# Patient Record
Sex: FEMALE | Race: WHITE | NOT HISPANIC OR LATINO | Employment: FULL TIME | URBAN - METROPOLITAN AREA
[De-identification: names, ages, dates, MRNs, and addresses within clinical notes are randomized per-mention and may not be internally consistent; named-entity substitution may affect disease eponyms.]

---

## 2018-03-28 ENCOUNTER — OFFICE VISIT (OUTPATIENT)
Dept: SURGERY | Facility: CLINIC | Age: 58
End: 2018-03-28
Payer: COMMERCIAL

## 2018-03-28 ENCOUNTER — OFFICE VISIT (OUTPATIENT)
Dept: GYNECOLOGY | Facility: CLINIC | Age: 58
End: 2018-03-28
Attending: OBSTETRICS & GYNECOLOGY
Payer: COMMERCIAL

## 2018-03-28 VITALS — HEIGHT: 67 IN | BODY MASS INDEX: 22.76 KG/M2 | WEIGHT: 145 LBS

## 2018-03-28 VITALS — SYSTOLIC BLOOD PRESSURE: 120 MMHG | WEIGHT: 150.8 LBS | DIASTOLIC BLOOD PRESSURE: 80 MMHG | BODY MASS INDEX: 23.62 KG/M2

## 2018-03-28 DIAGNOSIS — D25.9 UTERINE LEIOMYOMA, UNSPECIFIED LOCATION: ICD-10-CM

## 2018-03-28 DIAGNOSIS — Z12.39 SCREENING BREAST EXAMINATION: Primary | ICD-10-CM

## 2018-03-28 DIAGNOSIS — Z12.39 ENCOUNTER FOR BREAST CANCER SCREENING OTHER THAN MAMMOGRAM: ICD-10-CM

## 2018-03-28 DIAGNOSIS — Z01.419 ENCOUNTER FOR GYNECOLOGICAL EXAMINATION WITHOUT ABNORMAL FINDING: Primary | ICD-10-CM

## 2018-03-28 PROBLEM — E80.4 GILBERTS DISEASE: Status: ACTIVE | Noted: 2018-03-28

## 2018-03-28 PROBLEM — C44.91 MALIGNANT BASAL CELL NEOPLASM OF SKIN: Status: ACTIVE | Noted: 2018-03-28

## 2018-03-28 PROBLEM — Z82.49 FAMILY HISTORY OF EARLY CAD: Status: ACTIVE | Noted: 2018-03-28

## 2018-03-28 PROCEDURE — S0612 ANNUAL GYNECOLOGICAL EXAMINA: HCPCS | Performed by: OBSTETRICS & GYNECOLOGY

## 2018-03-28 PROCEDURE — 99211 OFF/OP EST MAY X REQ PHY/QHP: CPT | Performed by: SURGERY

## 2018-03-28 NOTE — PROGRESS NOTES
Yuli comes back to the office simply for breast examination.  She was seen in December with an abnormal mammogram.  At that time she could not have a clinical breast examination due to rather extensive scabies.  Since then she has not noticed any new breast masses.    There are no changes in her past medical history, past surgical history, medications or allergies.    Physical exam: Well-developed, well-nourished female in no apparent distress.  Her breast examination is symmetric with no dominant masses, skin changes, nipple discharge or axillary adenopathy.    Impression: Normal clinical breast examination with annual screening mammogram from December.  At her request she would like to continue to follow up once a year.  We will give her a prescription to have a bilateral mammogram done at [place] in December and she will return to see me for clinical examination after that.

## 2018-03-28 NOTE — PROGRESS NOTES
Chief Complaint:  Annual  Last visit 2017, pap neg/neg then  Recent mammo neg, sees Eliza for abn mammo in the past.  Up to date with colonoscopy.   No active complaints, no flushes,no vag dryness, using Ky.  No sexual issues.    HPI:  3/28/2018      Yuli Kuo is a 56 y.o. female who presents for annual exam. The patient has no complaints today. The patient is sexually active. GYN screening history: last pap: was normal. The patient has never been taking hormone replacement therapy. Patient denies post-menopausal vaginal bleeding.. The patient participates in regular exercise: yes.  The patient reports that there is not domestic violence in her life.    History of abnormal Pap smear: no  Family history of uterine or ovarian cancer: no  History of abnormal mammogram: yes - see above  Family history of breast cancer: no    OB History: Menstrual History:  OB History      Para Term  AB Living    0 0 0 0 0 0    SAB TAB Ectopic Multiple Live Births    0 0 0 0 0         Menarche age:   No LMP recorded. Patient is postmenopausal.         Medical History:   Past Medical History:   Diagnosis Date   • Avulsion fracture of distal fibula 10/2013   • Basal cell carcinoma    • Disease of thyroid gland     low   • Eosinophilic esophagitis    • Fibroids    • Gilbert's syndrome    • Hemorrhoids     rectal   • Multinodular goiter        Surgical History:   Past Surgical History:   Procedure Laterality Date   • COLONOSCOPY     • MOHS SURGERY     • SHOULDER ARTHROSCOPY Right    • SHOULDER SURGERY Bilateral        Social History:   Social History     Social History   • Marital status:      Spouse name: N/A   • Number of children: N/A   • Years of education: N/A     Social History Main Topics   • Smoking status: Never Smoker   • Smokeless tobacco: Never Used   • Alcohol use No   • Drug use: No   • Sexual activity: Not Asked     Other Topics Concern   • None     Social History  Narrative   • None       Family History:   Family History   Problem Relation Age of Onset   • Lung cancer Father    • Depression Father    • Anxiety disorder Father    • Brain cancer Father    • Prostate cancer Father    • Colon cancer Father    • Heart disease Mother    • Stroke Mother    • Atrial fibrillation Mother    • Hyperthyroidism Mother    • Glaucoma Paternal Grandmother        Current Medications:   Current Outpatient Prescriptions   Medication Sig Dispense Refill   • Lactobac no.41-Bifidobact no.7 (PROBIOTIC-10) 70 mg (3 billion cell) capsule No SIG Entered       No current facility-administered medications for this visit.        Allergies: Influenza virus vaccine tv 13-14 (18+) cell deriv. and Penicillins    Review of Systems  Constitutional: purposeful wgt loss  Gastrointestinal: negative  Genitourinary:negative  Reproductive:no breast pain or new or enlarging lumps on self exam, no vaginal bleeding, no hot flashes  Integument/breast: negative  Musculoskeletal:negative  Neurological: negative  Behavioral/Psych: negative  Endocrine: negative    Physical Exam  /80 (BP Location: Right upper arm, Patient Position: Sitting)   Wt 68.4 kg (150 lb 12.8 oz)   BMI 23.62 kg/m²      General Appearance: Alert, cooperative, no acute distress  Head: Normocephalic, without obvious abnormality, atraumatic    Neck: no adenopathy  Thyroid: no enlargement/tenderness/nodules  Lungs: Clear to auscultation bilaterally, respirations unlabored  Heart: Regular rate and rhythm, S1 and S2 normal, no murmur, rub or gallop  Breast: done by DR Kay today  Abdomen: Soft, nontender, nondistended,   no masses, no organomegaly  Back: kyphosisNo   Pelvic:     Vulva normal  Vagina normal mucosa  Cervixno lesions  Uterusanteverted  Adnexa normal adnexa  Rectal: mass No   Extremities: wnl  Musculoskeletal:wnl  Skin: Skin color, texture, turgor normal, no rashes or lesions  Lymph nodes: inguinal and axillary nodes  normal  Neurologic:oriented and  memory intact  Psych:normal affect and behavior appropriate    Assessment & Plan    Nl menopausal exam. Pap due 2022.   Sm myoma 8 mm 2016. Slightly smaller than prior. No need for repeat    Return in about 1 year (around 3/28/2019).  Sabra Reyes MD

## 2018-10-01 ENCOUNTER — TELEPHONE (OUTPATIENT)
Dept: GYNECOLOGY | Facility: CLINIC | Age: 58
End: 2018-10-01

## 2018-10-01 NOTE — TELEPHONE ENCOUNTER
Rt lower abd pain, intermittent for 6 weeks, not related to food, talked to her gi. Feels it may be related to new yoga. Can't relate to any provoking agent. No change in sxs. Needs exam.  Pt now feels it started before yoga.

## 2018-10-01 NOTE — TELEPHONE ENCOUNTER
Patient called to report intermittent right lower abdominal pain.  She has experiencing the pain for approximately six weeks.  She called to request an ultrasound script. Her phone number is [phone].

## 2018-10-30 ENCOUNTER — TRANSCRIBE ORDERS (OUTPATIENT)
Dept: SCHEDULING | Age: 58
End: 2018-10-30

## 2018-10-30 DIAGNOSIS — Z86.018 PERSONAL HISTORY OF OTHER BENIGN NEOPLASM: Primary | ICD-10-CM

## 2018-11-08 PROCEDURE — 82274 ASSAY TEST FOR BLOOD FECAL: CPT | Performed by: INTERNAL MEDICINE

## 2018-11-13 ENCOUNTER — LAB REQUISITION (OUTPATIENT)
Dept: LAB | Facility: HOSPITAL | Age: 58
End: 2018-11-13
Attending: INTERNAL MEDICINE
Payer: COMMERCIAL

## 2018-11-13 DIAGNOSIS — R19.7 DIARRHEA: ICD-10-CM

## 2018-11-14 LAB — HEMOCCULT STL QL IA: NEGATIVE

## 2018-11-16 ENCOUNTER — HOSPITAL ENCOUNTER (OUTPATIENT)
Dept: RADIOLOGY | Age: 58
Discharge: HOME | End: 2018-11-16
Attending: OBSTETRICS & GYNECOLOGY
Payer: COMMERCIAL

## 2018-11-16 DIAGNOSIS — Z86.018 PERSONAL HISTORY OF OTHER BENIGN NEOPLASM: ICD-10-CM

## 2018-11-16 PROCEDURE — 76856 US EXAM PELVIC COMPLETE: CPT

## 2019-01-16 ENCOUNTER — OFFICE VISIT (OUTPATIENT)
Dept: SURGERY | Facility: CLINIC | Age: 59
End: 2019-01-16
Payer: COMMERCIAL

## 2019-01-16 ENCOUNTER — HOSPITAL ENCOUNTER (OUTPATIENT)
Dept: RADIOLOGY | Age: 59
Discharge: HOME | End: 2019-01-16
Attending: SURGERY
Payer: COMMERCIAL

## 2019-01-16 VITALS
HEIGHT: 67 IN | SYSTOLIC BLOOD PRESSURE: 113 MMHG | TEMPERATURE: 97.7 F | BODY MASS INDEX: 23.39 KG/M2 | HEART RATE: 79 BPM | DIASTOLIC BLOOD PRESSURE: 71 MMHG | WEIGHT: 149 LBS

## 2019-01-16 DIAGNOSIS — Z12.39 ENCOUNTER FOR BREAST CANCER SCREENING OTHER THAN MAMMOGRAM: ICD-10-CM

## 2019-01-16 DIAGNOSIS — Z12.39 SCREENING BREAST EXAMINATION: ICD-10-CM

## 2019-01-16 DIAGNOSIS — Z12.39 SCREENING BREAST EXAMINATION: Primary | ICD-10-CM

## 2019-01-16 PROBLEM — D25.9 UTERINE FIBROID: Status: ACTIVE | Noted: 2019-01-16

## 2019-01-16 PROCEDURE — 99213 OFFICE O/P EST LOW 20 MIN: CPT | Performed by: SURGERY

## 2019-01-16 PROCEDURE — 77063 BREAST TOMOSYNTHESIS BI: CPT

## 2019-01-16 RX ORDER — GUAIFENESIN 1200 MG
TABLET, EXTENDED RELEASE 12 HR ORAL
COMMUNITY
End: 2022-04-20

## 2019-01-16 NOTE — LETTER
January 16, 2019     Yue Gonzalez MD  [address]  [facility] [address]    Patient: Yuli Kuo   YOB: 1961   Date of Visit: 1/16/2019       Dear Dr. Gonzalez:    Thank you for referring Yuli Kuo to me for evaluation. Below are my notes for this consultation.    If you have questions, please do not hesitate to call me. I look forward to following your patient along with you.         Sincerely,        Natalie Kay MD        CC: No Recipients  Natalie Kay MD  1/16/2019  6:08 PM  Signed  Karyna comes the office today for breast screening.  She previously had a lot of anxiety and concerns about her risk for developing breast cancer and at one point it actually requested prophylactic mastectomies.  She states that she does feel much better now in terms of her overall care.  She is postmenopausal and not on any estrogen and with significant weight loss, while she now feels that the breasts are lumpy they are not quite as concerning to her as they were in previous years.  She had a bilateral mammogram done through the breast imaging center earlier today that was reviewed in the office.  There were no suspicious changes and one-year follow-up was recommended.    There are no changes in her past medical history, past surgical history, medications or allergies.    There are no changes in her family history.  There is no family history of breast cancer.    ROS:   Significant for no abnormalities in all systems reviewed.    Physical exam: Well-developed, well-nourished female in no apparent distress.  She is alert and oriented ×3 and her mood and affect are appropriate.  Her HEENT has no cervical or supraclavicular adenopathy.  There are no thyroid masses.  Her breast examination is symmetric.  There are no dominant masses, skin changes, nipple discharge or axillary adenopathy.        Assessment/Plan :  Problem List Items Addressed This Visit     Screening breast  examination - Primary     She now feels much better about her breast self-examination as she is thinner and postmenopausal.  She has less anxiety about requiring bilateral mastectomies but would like to continue to come here once a year which is not unreasonable.  We will see her back in the office in 1 year with a routine screening bilateral mammogram.         Relevant Orders    BI DIAGNOSTIC MAMMOGRAM BILATERAL             Return in about 1 year (around 1/16/2020).    MD Natalie Mike MD

## 2019-01-16 NOTE — ASSESSMENT & PLAN NOTE
She now feels much better about her breast self-examination as she is thinner and postmenopausal.  She has less anxiety about requiring bilateral mastectomies but would like to continue to come here once a year which is not unreasonable.  We will see her back in the office in 1 year with a routine screening bilateral mammogram.

## 2019-01-16 NOTE — PROGRESS NOTES
Karyna comes the office today for breast screening.  She previously had a lot of anxiety and concerns about her risk for developing breast cancer and at one point it actually requested prophylactic mastectomies.  She states that she does feel much better now in terms of her overall care.  She is postmenopausal and not on any estrogen and with significant weight loss, while she now feels that the breasts are lumpy they are not quite as concerning to her as they were in previous years.  She had a bilateral mammogram done through the breast imaging center earlier today that was reviewed in the office.  There were no suspicious changes and one-year follow-up was recommended.    There are no changes in her past medical history, past surgical history, medications or allergies.    There are no changes in her family history.  There is no family history of breast cancer.    ROS:   Significant for no abnormalities in all systems reviewed.    Physical exam: Well-developed, well-nourished female in no apparent distress.  She is alert and oriented ×3 and her mood and affect are appropriate.  Her HEENT has no cervical or supraclavicular adenopathy.  There are no thyroid masses.  Her breast examination is symmetric.  There are no dominant masses, skin changes, nipple discharge or axillary adenopathy.        Assessment/Plan :  Problem List Items Addressed This Visit     Screening breast examination - Primary     She now feels much better about her breast self-examination as she is thinner and postmenopausal.  She has less anxiety about requiring bilateral mastectomies but would like to continue to come here once a year which is not unreasonable.  We will see her back in the office in 1 year with a routine screening bilateral mammogram.         Relevant Orders    BI DIAGNOSTIC MAMMOGRAM BILATERAL             Return in about 1 year (around 1/16/2020).    MD Natalie Mike MD

## 2020-01-22 ENCOUNTER — HOSPITAL ENCOUNTER (OUTPATIENT)
Dept: RADIOLOGY | Age: 60
Discharge: HOME | End: 2020-01-22
Attending: SURGERY
Payer: COMMERCIAL

## 2020-01-22 ENCOUNTER — OFFICE VISIT (OUTPATIENT)
Dept: SURGERY | Facility: CLINIC | Age: 60
End: 2020-01-22
Payer: COMMERCIAL

## 2020-01-22 VITALS
SYSTOLIC BLOOD PRESSURE: 113 MMHG | HEIGHT: 67 IN | BODY MASS INDEX: 23.39 KG/M2 | DIASTOLIC BLOOD PRESSURE: 75 MMHG | WEIGHT: 149 LBS | HEART RATE: 77 BPM

## 2020-01-22 DIAGNOSIS — Z12.39 SCREENING BREAST EXAMINATION: ICD-10-CM

## 2020-01-22 DIAGNOSIS — Z12.39 SCREENING BREAST EXAMINATION: Primary | ICD-10-CM

## 2020-01-22 PROCEDURE — G0279 TOMOSYNTHESIS, MAMMO: HCPCS

## 2020-01-22 PROCEDURE — 99213 OFFICE O/P EST LOW 20 MIN: CPT | Performed by: SURGERY

## 2020-01-22 NOTE — ASSESSMENT & PLAN NOTE
She has moderately dense breast tissue but no suspicious risk factors for breast cancer.  Today her clinical examination is normal but given her anxiety, we will see her back in the office in 1 year with a new baseline bilateral mammogram.

## 2020-01-22 NOTE — PROGRESS NOTES
Yuli comes to the office today for follow-up given a history of abnormal mammograms and anxiety regarding the possibility of developing breast cancer.  She really has no strong risk factors.  She is doing beautifully and has no complaints.  She had a bilateral mammogram done through the breast imaging center earlier today that was reviewed in the office.  There were no suspicious changes and one-year follow-up was recommended.  She has category C dense tissue.    There are no changes in her past medical history, past surgical history, medications or allergies.    There are no changes in her family history.    ROS:   Significant for no abnormalities in all systems reviewed.    Physical exam: Well-developed, well-nourished female in no apparent distress.  She is alert and oriented ×3 and her mood and affect are appropriate.  Her HEENT has no cervical or supraclavicular adenopathy.  There are no thyroid masses.  Her breast examination is symmetric.  There are no dominant masses, skin changes, nipple discharge or axillary adenopathy.        Assessment/Plan :  Problem List Items Addressed This Visit        Other    Screening breast examination - Primary     She has moderately dense breast tissue but no suspicious risk factors for breast cancer.  Today her clinical examination is normal but given her anxiety, we will see her back in the office in 1 year with a new baseline bilateral mammogram.         Relevant Orders    BI DIAGNOSTIC MAMMOGRAM BILATERAL             Return in about 1 year (around 1/22/2021).    MD Natalie Mike MD

## 2021-01-20 DIAGNOSIS — Z23 ENCOUNTER FOR IMMUNIZATION: ICD-10-CM

## 2021-01-30 ENCOUNTER — IMMUNIZATIONS (OUTPATIENT)
Dept: FAMILY MEDICINE CLINIC | Facility: HOSPITAL | Age: 61
End: 2021-01-30

## 2021-01-30 DIAGNOSIS — Z23 ENCOUNTER FOR IMMUNIZATION: Primary | ICD-10-CM

## 2021-01-30 PROCEDURE — 91301 SARS-COV-2 / COVID-19 MRNA VACCINE (MODERNA) 100 MCG: CPT

## 2021-01-30 PROCEDURE — 0011A SARS-COV-2 / COVID-19 MRNA VACCINE (MODERNA) 100 MCG: CPT

## 2021-03-17 ENCOUNTER — OFFICE VISIT (OUTPATIENT)
Dept: SURGERY | Facility: CLINIC | Age: 61
End: 2021-03-17
Payer: COMMERCIAL

## 2021-03-17 ENCOUNTER — HOSPITAL ENCOUNTER (OUTPATIENT)
Dept: RADIOLOGY | Facility: HOSPITAL | Age: 61
Discharge: HOME | End: 2021-03-17
Payer: COMMERCIAL

## 2021-03-17 VITALS
DIASTOLIC BLOOD PRESSURE: 91 MMHG | WEIGHT: 157 LBS | SYSTOLIC BLOOD PRESSURE: 141 MMHG | HEIGHT: 67 IN | BODY MASS INDEX: 24.64 KG/M2 | HEART RATE: 67 BPM

## 2021-03-17 DIAGNOSIS — Z12.39 SCREENING BREAST EXAMINATION: Primary | ICD-10-CM

## 2021-03-17 DIAGNOSIS — Z12.39 SCREENING BREAST EXAMINATION: ICD-10-CM

## 2021-03-17 PROCEDURE — 99212 OFFICE O/P EST SF 10 MIN: CPT | Performed by: SURGERY

## 2021-03-17 PROCEDURE — G0279 TOMOSYNTHESIS, MAMMO: HCPCS

## 2021-03-17 NOTE — ASSESSMENT & PLAN NOTE
She has no particularly strong risk factors for development of breast cancer.  At her request, we will see her back in the mammogram and office visit in 1 year.

## 2021-03-17 NOTE — PROGRESS NOTES
Yuli comes to the office today for follow-up given a history of abnormal mammograms.  Currently, she has no breast complaints.  In fact, she is quite happy.  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  Her and her  are building a house in [place].  She is postmenopausal and not on any estrogen.  She had a bilateral mammogram done through the breast imaging center earlier today that was reviewed in the office.  There were no suspicious changes and one-year follow-up was recommended.    There are no changes in her past medical history, past surgical history, medications or allergies.    There are no changes in her family history.    ROS:   Significant for abnormalities in all systems reviewed    Physical exam: Well-developed, well-nourished female in no apparent distress.  She is alert and oriented ×3 and her mood and affect are appropriate.  Her HEENT has no cervical or supraclavicular adenopathy.  There are no thyroid masses.  Her breast examination is symmetric.  There are no dominant masses, skin changes, nipple discharge or axillary adenopathy.        Assessment/Plan :  Problem List Items Addressed This Visit        Other    Screening breast examination - Primary     She has no particularly strong risk factors for development of breast cancer.  At her request, we will see her back in the mammogram and office visit in 1 year.         Relevant Orders    BI DIAGNOSTIC MAMMOGRAM BILATERAL (TOMOSYNTHESIS)             Return in about 1 year (around 3/17/2022).    MD Natalie Mike MD

## 2021-07-16 ENCOUNTER — TRANSCRIBE ORDERS (OUTPATIENT)
Dept: ADMINISTRATIVE | Age: 61
End: 2021-07-16

## 2021-07-16 ENCOUNTER — APPOINTMENT (OUTPATIENT)
Dept: LAB | Facility: HOSPITAL | Age: 61
End: 2021-07-16
Attending: INTERNAL MEDICINE
Payer: COMMERCIAL

## 2021-07-16 DIAGNOSIS — E80.4 GILBERT SYNDROME: Primary | ICD-10-CM

## 2021-07-16 DIAGNOSIS — E80.4 GILBERT SYNDROME: ICD-10-CM

## 2021-07-16 DIAGNOSIS — R17 UNSPECIFIED JAUNDICE: ICD-10-CM

## 2021-07-16 LAB
ALBUMIN SERPL-MCNC: 4.1 G/DL (ref 3.4–5)
ALP SERPL-CCNC: 73 IU/L (ref 35–126)
ALT SERPL-CCNC: 20 IU/L (ref 11–54)
ANION GAP SERPL CALC-SCNC: 10 MEQ/L (ref 3–15)
AST SERPL-CCNC: 18 IU/L (ref 15–41)
BASOPHILS # BLD: 0.08 K/UL (ref 0.01–0.1)
BASOPHILS NFR BLD: 1.1 %
BILIRUB SERPL-MCNC: 1.8 MG/DL (ref 0.3–1.2)
BUN SERPL-MCNC: 17 MG/DL (ref 8–20)
CALCIUM SERPL-MCNC: 9.3 MG/DL (ref 8.9–10.3)
CHLORIDE SERPL-SCNC: 103 MEQ/L (ref 98–109)
CO2 SERPL-SCNC: 26 MEQ/L (ref 22–32)
CREAT SERPL-MCNC: 1 MG/DL (ref 0.6–1.1)
DIFFERENTIAL METHOD BLD: ABNORMAL
EOSINOPHIL # BLD: 0.93 K/UL (ref 0.04–0.36)
EOSINOPHIL NFR BLD: 13.1 %
ERYTHROCYTE [DISTWIDTH] IN BLOOD BY AUTOMATED COUNT: 12.6 % (ref 11.7–14.4)
GFR SERPL CREATININE-BSD FRML MDRD: 56.4 ML/MIN/1.73M*2
GGT SERPL-CCNC: 24 IU/L (ref 7–50)
GLUCOSE SERPL-MCNC: 89 MG/DL (ref 70–99)
HCT VFR BLDCO AUTO: 43.8 % (ref 35–45)
HGB BLD-MCNC: 14.9 G/DL (ref 11.8–15.7)
IMM GRANULOCYTES # BLD AUTO: 0.02 K/UL (ref 0–0.08)
IMM GRANULOCYTES NFR BLD AUTO: 0.3 %
LYMPHOCYTES # BLD: 1.63 K/UL (ref 1.2–3.5)
LYMPHOCYTES NFR BLD: 22.9 %
MCH RBC QN AUTO: 31.4 PG (ref 28–33.2)
MCHC RBC AUTO-ENTMCNC: 34 G/DL (ref 32.2–35.5)
MCV RBC AUTO: 92.2 FL (ref 83–98)
MONOCYTES # BLD: 0.62 K/UL (ref 0.28–0.8)
MONOCYTES NFR BLD: 8.7 %
NEUTROPHILS # BLD: 3.83 K/UL (ref 1.7–7)
NEUTS SEG NFR BLD: 53.9 %
NRBC BLD-RTO: 0 %
PDW BLD AUTO: 10.5 FL (ref 9.4–12.3)
PLATELET # BLD AUTO: 234 K/UL (ref 150–369)
POTASSIUM SERPL-SCNC: 4.1 MEQ/L (ref 3.6–5.1)
PROT SERPL-MCNC: 6 G/DL (ref 6–8.2)
RBC # BLD AUTO: 4.75 M/UL (ref 3.93–5.22)
SODIUM SERPL-SCNC: 139 MEQ/L (ref 136–144)
WBC # BLD AUTO: 7.11 K/UL (ref 3.8–10.5)

## 2021-07-16 PROCEDURE — 82977 ASSAY OF GGT: CPT

## 2021-07-16 PROCEDURE — 36415 COLL VENOUS BLD VENIPUNCTURE: CPT

## 2021-07-16 PROCEDURE — 85025 COMPLETE CBC W/AUTO DIFF WBC: CPT

## 2021-07-16 PROCEDURE — 80053 COMPREHEN METABOLIC PANEL: CPT

## 2022-03-30 ENCOUNTER — TRANSCRIBE ORDERS (OUTPATIENT)
Dept: SCHEDULING | Age: 62
End: 2022-03-30

## 2022-03-30 DIAGNOSIS — R10.31 RIGHT LOWER QUADRANT PAIN: Primary | ICD-10-CM

## 2022-04-13 ENCOUNTER — TRANSCRIBE ORDERS (OUTPATIENT)
Dept: SCHEDULING | Age: 62
End: 2022-04-13

## 2022-04-13 DIAGNOSIS — L57.8 OTHER SKIN CHANGES DUE TO CHRONIC EXPOSURE TO NONIONIZING RADIATION: Primary | ICD-10-CM

## 2022-04-20 ENCOUNTER — HOSPITAL ENCOUNTER (OUTPATIENT)
Dept: RADIOLOGY | Age: 62
Discharge: HOME | End: 2022-04-20
Attending: OBSTETRICS & GYNECOLOGY
Payer: COMMERCIAL

## 2022-04-20 ENCOUNTER — OFFICE VISIT (OUTPATIENT)
Dept: SURGERY | Facility: CLINIC | Age: 62
End: 2022-04-20
Payer: COMMERCIAL

## 2022-04-20 ENCOUNTER — HOSPITAL ENCOUNTER (OUTPATIENT)
Dept: RADIOLOGY | Facility: HOSPITAL | Age: 62
Discharge: HOME | End: 2022-04-20
Attending: SURGERY
Payer: COMMERCIAL

## 2022-04-20 VITALS
TEMPERATURE: 97 F | SYSTOLIC BLOOD PRESSURE: 116 MMHG | BODY MASS INDEX: 24.33 KG/M2 | DIASTOLIC BLOOD PRESSURE: 76 MMHG | WEIGHT: 155 LBS | RESPIRATION RATE: 16 BRPM | HEART RATE: 63 BPM | HEIGHT: 67 IN | OXYGEN SATURATION: 99 %

## 2022-04-20 DIAGNOSIS — Z12.39 SCREENING BREAST EXAMINATION: ICD-10-CM

## 2022-04-20 DIAGNOSIS — Z12.39 SCREENING BREAST EXAMINATION: Primary | ICD-10-CM

## 2022-04-20 DIAGNOSIS — R10.31 RIGHT LOWER QUADRANT PAIN: ICD-10-CM

## 2022-04-20 PROCEDURE — 76830 TRANSVAGINAL US NON-OB: CPT

## 2022-04-20 PROCEDURE — 99212 OFFICE O/P EST SF 10 MIN: CPT | Performed by: SURGERY

## 2022-04-20 PROCEDURE — 77063 BREAST TOMOSYNTHESIS BI: CPT

## 2022-04-20 PROCEDURE — 3008F BODY MASS INDEX DOCD: CPT | Performed by: SURGERY

## 2022-04-20 RX ORDER — CHOLECALCIFEROL (VITAMIN D3) 25 MCG
2000 TABLET ORAL DAILY
COMMUNITY
End: 2023-05-09 | Stop reason: HOSPADM

## 2022-04-20 NOTE — PROGRESS NOTES
Yuli returns to the office today for follow-up given anxiety regarding the possibility of developing breast cancer.  Overall, she is doing well.  She is postmenopausal and not on any estrogen.  She is very happy now that she is decided to retire and is still moving forward with building their house in [place].  She had a bilateral mammogram done through the breast imaging center earlier today that was reviewed in the office.  There were no suspicious changes and one-year follow-up was recommended.  She has category C dense breast tissue.    There are no changes in her past medical history, past surgical history, medications or allergies.    There are no changes in her family history.    ROS:   Significant for no abnormalities in all systems reviewed.    Physical exam: Well-developed, well-nourished female in no apparent distress.  She is alert and oriented ×3 and her mood and affect are appropriate.  Her HEENT has no cervical or supraclavicular adenopathy.  There are no thyroid masses.  Her breast examination is symmetric.  There are no dominant masses, skin changes, nipple discharge or axillary adenopathy.        Assessment/Plan :  Problem List Items Addressed This Visit        Other    Screening breast examination - Primary    Relevant Orders    BI SCREENING MAMMOGRAM BILATERAL(TOMOSYNTHESIS) (Completed)    BI SCREENING MAMMOGRAM BILATERAL(TOMOSYNTHESIS)             Return in about 1 year (around 4/20/2023).    Natalie Kay MD

## 2022-04-20 NOTE — ASSESSMENT & PLAN NOTE
There are no abnormalities on her clinical breast examination or screening mammogram.  She will return to the office in 1 year at her request with bilateral imaging and screening mammogram.

## 2022-11-11 ENCOUNTER — TRANSCRIBE ORDERS (OUTPATIENT)
Dept: SCHEDULING | Age: 62
End: 2022-11-11

## 2022-11-11 DIAGNOSIS — M54.2 CERVICALGIA: Primary | ICD-10-CM

## 2022-11-22 ENCOUNTER — HOSPITAL ENCOUNTER (OUTPATIENT)
Dept: RADIOLOGY | Age: 62
Discharge: HOME | End: 2022-11-22
Attending: FAMILY MEDICINE
Payer: OTHER MISCELLANEOUS

## 2022-11-22 DIAGNOSIS — M54.2 CERVICALGIA: ICD-10-CM

## 2022-12-13 ENCOUNTER — HOSPITAL ENCOUNTER (OUTPATIENT)
Dept: RADIOLOGY | Age: 62
Discharge: HOME | End: 2022-12-13
Attending: INTERNAL MEDICINE
Payer: COMMERCIAL

## 2022-12-13 DIAGNOSIS — L57.8 OTHER SKIN CHANGES DUE TO CHRONIC EXPOSURE TO NONIONIZING RADIATION: ICD-10-CM

## 2022-12-13 PROCEDURE — 76536 US EXAM OF HEAD AND NECK: CPT

## 2023-04-24 ENCOUNTER — TELEPHONE (OUTPATIENT)
Dept: SURGERY | Facility: CLINIC | Age: 63
End: 2023-04-24
Payer: COMMERCIAL

## 2023-04-24 DIAGNOSIS — Z12.31 ENCOUNTER FOR SCREENING MAMMOGRAM FOR HIGH-RISK PATIENT: Primary | ICD-10-CM

## 2023-04-24 NOTE — TELEPHONE ENCOUNTER
Test Order Request   Patient PCP: Pt States, No Pcp  Next Office Visit: 5/9/2023  Preferred Lab: [facility]   Tests Requested: screening mammogram  , MyChart, or US Mail?: MyChart and Mail    The practice will reach out to discuss your request within 2 business days.

## 2023-04-26 ENCOUNTER — TELEPHONE (OUTPATIENT)
Dept: SURGERY | Facility: CLINIC | Age: 63
End: 2023-04-26
Payer: COMMERCIAL

## 2023-04-26 NOTE — TELEPHONE ENCOUNTER
Patient is requesting the mammo order be sent through [facility] as a message attachment , she may use a facility outside of [facility].

## 2023-05-09 ENCOUNTER — TELEPHONE (OUTPATIENT)
Dept: SURGERY | Facility: CLINIC | Age: 63
End: 2023-05-09

## 2023-05-09 ENCOUNTER — OFFICE VISIT (OUTPATIENT)
Dept: SURGERY | Facility: CLINIC | Age: 63
End: 2023-05-09
Payer: COMMERCIAL

## 2023-05-09 VITALS
SYSTOLIC BLOOD PRESSURE: 118 MMHG | WEIGHT: 156 LBS | OXYGEN SATURATION: 96 % | DIASTOLIC BLOOD PRESSURE: 76 MMHG | HEART RATE: 70 BPM | BODY MASS INDEX: 24.48 KG/M2 | HEIGHT: 67 IN | RESPIRATION RATE: 18 BRPM | TEMPERATURE: 97.1 F

## 2023-05-09 DIAGNOSIS — Z12.39 SCREENING BREAST EXAMINATION: Primary | ICD-10-CM

## 2023-05-09 DIAGNOSIS — R92.30 DENSE BREAST TISSUE ON MAMMOGRAM: ICD-10-CM

## 2023-05-09 PROCEDURE — 99213 OFFICE O/P EST LOW 20 MIN: CPT | Performed by: SURGERY

## 2023-05-09 PROCEDURE — 3008F BODY MASS INDEX DOCD: CPT | Performed by: SURGERY

## 2023-05-09 ASSESSMENT — PAIN SCALES - GENERAL: PAINLEVEL: 0-NO PAIN

## 2023-05-09 NOTE — ASSESSMENT & PLAN NOTE
She had bilateral screening mammogram in April, 2022 through [facility] with no evidence of malignancy.  She had a bilateral mammogram with spot compression of the right breast in May, 2023 through Dr. Jalloh's office.  No significant change in either breast was noted.  Dr. Jalloh gave a BI-RADS 0 evaluation of the mammogram until [facility] images can be reviewed.  The disc of [facility] images are currently at Dr. Jalloh's office.  She has heterogeneously dense breast tissue and is not interested in supplemental breast imaging.  Clinical exam is negative for any palpable density, overlying skin changes or breast abnormality.  At a minimum, we will plan to see her back in the office in 1 year with a new baseline bilateral mammogram, which will be completed through Dr. Jalloh's office.  I assured [facility] that if any changes were conveyed after review of breast imaging we would update her promptly.

## 2023-05-09 NOTE — ASSESSMENT & PLAN NOTE
She has scattered to heterogeneously dense breast tissue.  She has no family history of breast cancer but does have a fear of developing breast cancer or missing an early breast cancer.  We discussed the pros and cons of breast MRI with an abbreviated breast MRI as well as potential for false positives.  She will consider an abbreviated breast MRI but at this point we will continue with bilateral diagnostic mammography.  She will contact our office if she wishes to pursue a breast MRI in the future.  We will plan to see her back in the office in 1 year with a new baseline mammogram.

## 2023-05-09 NOTE — PROGRESS NOTES
Yuli returns to the office today for follow-up for clinical exam and review of breast imaging, given her underlying anxiety surrounding breast cancer.  She offers no breast complaints.  She was happy to report that she is enjoying [facility] in her house in [place] is completed.  She had a bilateral screening mammogram done through Dr. Jalloh's office on May 2, 2023 that was reviewed in the office. She had previously had breast imaging through [facility]. No evidence of malignancy in the left breast on initial mammographic imaging.  There is an asymmetry in the right breast which required spot compression.  The asymmetry did not persist in the right breast.  Unfortunately, her imaging from [facility] was not available at the time of mammographic interpretation.  She was given a BI-RADS 0 until outside imaging became available for full evaluation.  She dropped those images off to Dr. Jalloh's office today and we will await an addendum.  At a minimum she is recommended for annual screening mammography.  She denies any family history of breast cancer previous breast biopsy.  She has heterogeneously dense breast tissue.    There are no changes in her past medical history, past surgical history, medications or allergies.    There are no changes in her family history.    ROS:   Significant for no abnormalities in all systems reviewed.    Physical exam: Well-developed, well-nourished female in no apparent distress.  She is alert and oriented ×3 and her mood and affect are appropriate.  Her HEENT has no cervical or supraclavicular adenopathy.  There are no thyroid masses.  Her breast examination is symmetric.  There are no dominant masses, skin changes, nipple discharge or axillary adenopathy.    Assessment/Plan :  Problem List Items Addressed This Visit        Other    Screening breast examination - Primary     She had bilateral screening mammogram in April, 2022 through [facility] with no evidence of  malignancy.  She had a bilateral mammogram with spot compression of the right breast in May, 2023 through Dr. Jalloh's office.  No significant change in either breast was noted.  Dr. Jalloh gave a BI-RADS 0 evaluation of the mammogram until [facility] health images can be reviewed.  The disc of [facility] health images are currently at Dr. Jalloh's office.  She has heterogeneously dense breast tissue and is not interested in supplemental breast imaging.  Clinical exam is negative for any palpable density, overlying skin changes or breast abnormality.  At a minimum, we will plan to see her back in the office in 1 year with a new baseline bilateral mammogram, which will be completed through Dr. Jalloh's office.  I assured [facility] that if any changes were conveyed after review of breast imaging we would update her promptly.         Relevant Orders    BI SCREENING MAMMOGRAM BILATERAL(TOMOSYNTHESIS)    Dense breast tissue on mammogram     She has scattered to heterogeneously dense breast tissue.  She has no family history of breast cancer but does have a fear of developing breast cancer or missing an early breast cancer.  We discussed the pros and cons of breast MRI with an abbreviated breast MRI as well as potential for false positives.  She will consider an abbreviated breast MRI but at this point we will continue with bilateral diagnostic mammography.  She will contact our office if she wishes to pursue a breast MRI in the future.  We will plan to see her back in the office in 1 year with a new baseline mammogram.          At a minimum, we will plan to see her back in the office in 1 year with a new baseline mammogram which will be completed through Dr. Dhara Jalloh's office.  At this point her imaging this year was considered a BI-RADS 0 until Dr. Jalloh has the opportunity to review previous [facility] imaging.  We will contact [facility] if any changes and recommendations are made and adjust her schedule accordingly.  Tali  JACQUELINE White

## 2023-10-16 ENCOUNTER — TRANSCRIBE ORDERS (OUTPATIENT)
Dept: SCHEDULING | Age: 63
End: 2023-10-16

## 2023-10-16 DIAGNOSIS — R63.4 ABNORMAL WEIGHT LOSS: Primary | ICD-10-CM

## 2023-11-08 ENCOUNTER — HOSPITAL ENCOUNTER (OUTPATIENT)
Dept: RADIOLOGY | Age: 63
Discharge: HOME | End: 2023-11-08
Attending: INTERNAL MEDICINE
Payer: COMMERCIAL

## 2023-11-08 DIAGNOSIS — R63.4 ABNORMAL WEIGHT LOSS: ICD-10-CM

## 2023-11-08 PROCEDURE — 76705 ECHO EXAM OF ABDOMEN: CPT

## 2023-11-16 ENCOUNTER — TRANSCRIBE ORDERS (OUTPATIENT)
Dept: SCHEDULING | Age: 63
End: 2023-11-16

## 2023-11-16 DIAGNOSIS — R68.81 EARLY SATIETY: Primary | ICD-10-CM

## 2023-11-16 DIAGNOSIS — R63.4 ABNORMAL WEIGHT LOSS: ICD-10-CM

## 2023-11-16 DIAGNOSIS — R63.8 OTHER SYMPTOMS AND SIGNS CONCERNING FOOD AND FLUID INTAKE: ICD-10-CM

## 2023-11-20 ENCOUNTER — TRANSCRIBE ORDERS (OUTPATIENT)
Dept: ADMINISTRATIVE | Age: 63
End: 2023-11-20

## 2023-11-20 ENCOUNTER — APPOINTMENT (OUTPATIENT)
Dept: LAB | Facility: HOSPITAL | Age: 63
End: 2023-11-20
Attending: INTERNAL MEDICINE
Payer: COMMERCIAL

## 2023-11-20 DIAGNOSIS — K80.20 CALCULUS OF GALLBLADDER WITHOUT CHOLECYSTITIS WITHOUT OBSTRUCTION: ICD-10-CM

## 2023-11-20 DIAGNOSIS — K20.0 EOSINOPHILIC ESOPHAGITIS: ICD-10-CM

## 2023-11-20 DIAGNOSIS — R74.01 ELEVATION OF LEVELS OF LIVER TRANSAMINASE LEVELS: Primary | ICD-10-CM

## 2023-11-20 DIAGNOSIS — R76.0 RAISED ANTIBODY TITER: ICD-10-CM

## 2023-11-20 DIAGNOSIS — R63.4 ABNORMAL WEIGHT LOSS: ICD-10-CM

## 2023-11-20 DIAGNOSIS — R74.01 ELEVATION OF LEVELS OF LIVER TRANSAMINASE LEVELS: ICD-10-CM

## 2023-11-20 LAB
ALBUMIN SERPL-MCNC: 4.4 G/DL (ref 3.5–5.7)
ALP SERPL-CCNC: 71 IU/L (ref 34–125)
ALT SERPL-CCNC: 15 IU/L (ref 7–52)
ANION GAP SERPL CALC-SCNC: 8 MEQ/L (ref 3–15)
AST SERPL-CCNC: 19 IU/L (ref 13–39)
BILIRUB DIRECT SERPL-MCNC: 0.3 MG/DL
BILIRUB SERPL-MCNC: 1.8 MG/DL (ref 0.3–1.2)
BUN SERPL-MCNC: 15 MG/DL (ref 7–25)
CALCIUM SERPL-MCNC: 9.4 MG/DL (ref 8.6–10.3)
CHLORIDE SERPL-SCNC: 106 MEQ/L (ref 98–107)
CO2 SERPL-SCNC: 27 MEQ/L (ref 21–31)
CREAT SERPL-MCNC: 0.8 MG/DL (ref 0.6–1.2)
GFR SERPL CREATININE-BSD FRML MDRD: >60 ML/MIN/1.73M*2
GLUCOSE SERPL-MCNC: 84 MG/DL (ref 70–99)
POTASSIUM SERPL-SCNC: 4.1 MEQ/L (ref 3.5–5.1)
PROT SERPL-MCNC: 6.1 G/DL (ref 6–8.2)
SODIUM SERPL-SCNC: 141 MEQ/L (ref 136–145)

## 2023-11-20 PROCEDURE — 36415 COLL VENOUS BLD VENIPUNCTURE: CPT

## 2023-11-20 PROCEDURE — 82247 BILIRUBIN TOTAL: CPT

## 2023-11-20 PROCEDURE — 80053 COMPREHEN METABOLIC PANEL: CPT

## 2023-11-27 ENCOUNTER — OFFICE VISIT (OUTPATIENT)
Dept: INTERNAL MEDICINE | Facility: CLINIC | Age: 63
End: 2023-11-27
Payer: COMMERCIAL

## 2023-11-27 VITALS
HEIGHT: 67 IN | BODY MASS INDEX: 24.17 KG/M2 | DIASTOLIC BLOOD PRESSURE: 90 MMHG | WEIGHT: 154 LBS | SYSTOLIC BLOOD PRESSURE: 122 MMHG | HEART RATE: 73 BPM | OXYGEN SATURATION: 98 % | RESPIRATION RATE: 18 BRPM

## 2023-11-27 DIAGNOSIS — Z85.828 HISTORY OF BASAL CELL CARCINOMA: ICD-10-CM

## 2023-11-27 DIAGNOSIS — Z00.00 PREVENTATIVE HEALTH CARE: Primary | ICD-10-CM

## 2023-11-27 PROCEDURE — 99386 PREV VISIT NEW AGE 40-64: CPT | Performed by: INTERNAL MEDICINE

## 2023-11-27 PROCEDURE — 3008F BODY MASS INDEX DOCD: CPT | Performed by: INTERNAL MEDICINE

## 2023-11-27 ASSESSMENT — PATIENT HEALTH QUESTIONNAIRE - PHQ9: SUM OF ALL RESPONSES TO PHQ9 QUESTIONS 1 & 2: 0

## 2023-11-27 NOTE — PROGRESS NOTES
"SUBJECTIVE:   61 y.o. female for new patient visit    Here to establish care, she would like to have an annual physical but she is most concerned about a recent severe illness over the summer with subsequent weakness, neck pain and unintentional weight loss, she completed a comprehensive GI workup at the time, all of which seems to have been negative other than some mildly elevated LFTs which have since normalized.  For the unintentional weight loss GI recommended an abdominal CT which she will complete to finalize her workup but her weight has stabilized and she is feeling well.      Hx BCC: she has had BCC removed in the past as well as \"pre-melanoma\" for which she is establishing with a new dermatologist in the near future     Weight: The patient reports no significant weight change.  Diet: healthy balanced diet, vegetarian  Exercise: exercises regularly    Past Medical History:   Diagnosis Date    Avulsion fracture of distal fibula 10/2013    Basal cell carcinoma     Disease of thyroid gland 2010    low    Eosinophilic esophagitis     Fibroids     Gilbert's syndrome     Hemorrhoids     rectal    Multinodular goiter 2013     Patient Active Problem List   Diagnosis    Malignant basal cell neoplasm of skin    Family history of early CAD    [place] disease    Hemorrhoids    Uterine fibroid    Screening breast examination    Dense breast tissue on mammogram     Past Surgical History:   Procedure Laterality Date    COLONOSCOPY  2015    MOHS SURGERY  2013    SHOULDER ARTHROSCOPY Right 1998    SHOULDER SURGERY Bilateral 1995/2001     Family History   Problem Relation Age of Onset    Lung cancer Biological Father     Depression Biological Father     Anxiety disorder Biological Father     Brain cancer Biological Father     Prostate cancer Biological Father     Colon cancer Biological Father     Heart disease Biological Mother     Stroke Biological Mother     Atrial fibrillation Biological " "Mother     Hyperthyroidism Biological Mother     Glaucoma Paternal Grandmother      No current outpatient medications on file.   Social History     Tobacco Use    Smoking status: Never    Smokeless tobacco: Never   Substance Use Topics    Alcohol use: No    Drug use: No     Allergies   Allergen Reactions    Marijuana/Cannabinoid Anaphylaxis    Influenza Virus Vaccine Tv 13-14 (18+) Cell Deriv.      Other reaction(s): vertigo    Penicillins Hives       ROS negative unless otherwise specified     OBJECTIVE:  Visit Vitals  /90 (BP Location: Left upper arm)   Pulse 73   Resp 18   Ht 1.702 m (5' 7\")   Wt 69.9 kg (154 lb)   SpO2 98%   BMI 24.12 kg/m²      Gait:  Normal  Alert and well appearing   Awake and oriented with normal affect and appropriate behavior   HEENT: Normocephalic and atraumatic, pupils equal, round, OP clear with moist mucous membranes  Neck:  supple, no thyroid enlargement, no LAD  Lungs: Normal breath sounds, lungs CTA with no RRW  Heart:Regular rate rhythm with no murmurs, gallops or rubs   Abd: Soft, non-tender, normal bowel sounds; no bruits, organomegaly or masses.  Ext: No clubbing, cyanosis or edema   Skin:  No lesions on exposed skin      Assessment/Plan     1. Preventative health care  Unclear etiology of her illness over the summer though she suspects altitude sickness, regardless she is currently symptom free and her labs have normalized so we will pursue no further workup, she may not complete her CT abdomen given her lack of sx, labs will be scanned in, obtained at the beginning of November.  - Hemoglobin A1c; Future  - Lipid panel; Future    2. History of basal cell carcinoma  Stable  - f/u with Dermatology as scheduled    Preventive Services:   Adult DTaP: deferred per patient preference  Flu Vaccine advised: refused  Zostervax/Shingrix: utd    Health Maintenance:  Colonoscopy: utd  Mammogram: utd  Pap smear: utd    Anticipatory Guidance   Safe Sex/STD's yes  Smoking " Cessation yes  Sunscreen/ABCDs yes  Diet/Exercise yes  Recommend routing opth & dental care  yes  Screened for fall risk yes  Screened for depression yes  Satisfied with access to care yes     F/u 1 year for annual physical

## 2023-11-30 ENCOUNTER — HOSPITAL ENCOUNTER (OUTPATIENT)
Dept: RADIOLOGY | Facility: HOSPITAL | Age: 63
Discharge: HOME | End: 2023-11-30
Attending: INTERNAL MEDICINE
Payer: COMMERCIAL

## 2023-11-30 DIAGNOSIS — R68.81 EARLY SATIETY: ICD-10-CM

## 2023-11-30 DIAGNOSIS — R63.4 ABNORMAL WEIGHT LOSS: ICD-10-CM

## 2023-11-30 DIAGNOSIS — R63.8 OTHER SYMPTOMS AND SIGNS CONCERNING FOOD AND FLUID INTAKE: ICD-10-CM

## 2023-11-30 PROCEDURE — 25500000 HC DRUGS/INCIDENT RAD: Performed by: INTERNAL MEDICINE

## 2023-11-30 PROCEDURE — 63600105 HC IODINE BASED CONTRAST: Mod: JW | Performed by: INTERNAL MEDICINE

## 2023-11-30 PROCEDURE — 74177 CT ABD & PELVIS W/CONTRAST: CPT

## 2023-11-30 RX ORDER — IOPAMIDOL 755 MG/ML
80 INJECTION, SOLUTION INTRAVASCULAR
Status: COMPLETED | OUTPATIENT
Start: 2023-11-30 | End: 2023-11-30

## 2023-11-30 RX ADMIN — IOPAMIDOL 80 ML: 755 INJECTION, SOLUTION INTRAVENOUS at 19:27

## 2023-11-30 RX ADMIN — BARIUM SULFATE 900 ML: 21 SUSPENSION ORAL at 19:26

## 2023-12-01 LAB
CHOLEST SERPL-MCNC: 173 MG/DL (ref 100–199)
HBA1C MFR BLD: 5.3 % (ref 4.8–5.6)
HDLC SERPL-MCNC: 76 MG/DL
LDLC SERPL CALC-MCNC: 86 MG/DL (ref 0–99)
TRIGL SERPL-MCNC: 58 MG/DL (ref 0–149)
VLDLC SERPL CALC-MCNC: 11 MG/DL (ref 5–40)

## 2024-04-22 ENCOUNTER — TELEPHONE (OUTPATIENT)
Dept: SURGERY | Facility: CLINIC | Age: 64
End: 2024-04-22
Payer: MEDICARE

## 2024-10-21 ENCOUNTER — OFFICE VISIT (OUTPATIENT)
Dept: INTERNAL MEDICINE | Facility: CLINIC | Age: 64
End: 2024-10-21
Payer: MEDICARE

## 2024-10-21 ENCOUNTER — TELEPHONE (OUTPATIENT)
Dept: INTERNAL MEDICINE | Facility: CLINIC | Age: 64
End: 2024-10-21

## 2024-10-21 ENCOUNTER — OFFICE VISIT (OUTPATIENT)
Dept: SURGERY | Facility: CLINIC | Age: 64
End: 2024-10-21
Payer: MEDICARE

## 2024-10-21 VITALS
RESPIRATION RATE: 18 BRPM | SYSTOLIC BLOOD PRESSURE: 112 MMHG | WEIGHT: 156.6 LBS | OXYGEN SATURATION: 98 % | BODY MASS INDEX: 24.58 KG/M2 | HEIGHT: 67 IN | DIASTOLIC BLOOD PRESSURE: 88 MMHG | HEART RATE: 73 BPM

## 2024-10-21 VITALS
HEIGHT: 67 IN | TEMPERATURE: 97.9 F | DIASTOLIC BLOOD PRESSURE: 98 MMHG | BODY MASS INDEX: 23.86 KG/M2 | WEIGHT: 152 LBS | SYSTOLIC BLOOD PRESSURE: 146 MMHG | RESPIRATION RATE: 18 BRPM | HEART RATE: 79 BPM

## 2024-10-21 DIAGNOSIS — K80.20 GALLSTONES: ICD-10-CM

## 2024-10-21 DIAGNOSIS — E78.00 PURE HYPERCHOLESTEROLEMIA: Primary | ICD-10-CM

## 2024-10-21 DIAGNOSIS — Z12.39 SCREENING BREAST EXAMINATION: Primary | ICD-10-CM

## 2024-10-21 DIAGNOSIS — Z82.49 FAMILY HISTORY OF EARLY CAD: ICD-10-CM

## 2024-10-21 DIAGNOSIS — E04.1 THYROID NODULE: ICD-10-CM

## 2024-10-21 DIAGNOSIS — R10.13 EPIGASTRIC PAIN: Primary | ICD-10-CM

## 2024-10-21 PROCEDURE — 200200 PR NO CHARGE: Performed by: SURGERY

## 2024-10-21 PROCEDURE — 99214 OFFICE O/P EST MOD 30 MIN: CPT | Performed by: INTERNAL MEDICINE

## 2024-10-21 PROCEDURE — 99213 OFFICE O/P EST LOW 20 MIN: CPT | Performed by: SURGERY

## 2024-10-21 PROCEDURE — 200200 PR NO CHARGE: Performed by: INTERNAL MEDICINE

## 2024-10-21 ASSESSMENT — PAIN SCALES - GENERAL: PAINLEVEL_OUTOF10: 0-NO PAIN

## 2024-10-21 NOTE — LETTER
October 21, 2024     Larissa Buenrostro MD  [address]  [address]  [facility] [address]    Patient: Yuli Kuo Dr.  YOB: 1961  Date of Visit: 10/21/2024      Dear Dr. Buenrostro:    Thank you for referring Yuli Kuo Dr. to me for evaluation. Below are my notes for this consultation.    If you have questions, please do not hesitate to call me. I look forward to following your patient along with you.         Sincerely,        JACQUELINE Collier        CC: MD Jaden Rothman Annette M, CRNP  10/21/2024  1:18 PM  Sign when Signing Visit  Yuli returns to the office today for follow-up given her dense breast tissue and anxiety regarding her overall breast health.  She offers no breast complaints and was happy to share that her house in [place] has been completed and she and her  spend 6 months out of the year in [place] with her 2 dogs and cat.  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  She had a bilateral mammogram done through Dr. Dhara Jalloh's office on April 22, 2024 but unfortunately there is no disc for review.  According to the dictated report, there are scattered calcifications in the left breast which remain unchanged.  A right breast asymmetry was noted and a definite mass was not identified with spot compression.  An ultrasound was performed by Dr. Jalloh but again no discrete mass, cystic lesion or solid mass was identified.  This was read as a BI-RADS 2 and she will be due for repeat breast imaging in April 2025.  She has scattered breast tissue density according to the dictated report.      There are no changes in her past medical history, past surgical history, medications or allergies.    There are no changes in her family history.    ROS:   Significant for no abnormalities in all systems reviewed.    Physical exam: Well-developed,  well-nourished female in no apparent distress.  She is alert and oriented ×3 and her mood and affect are appropriate.  Her HEENT has no cervical or supraclavicular adenopathy.  There are no thyroid masses.  Her breast examination is symmetric.  There are no dominant masses, skin changes, nipple discharge or axillary adenopathy.  Assessment/Plan:  Problem List Items Addressed This Visit          Other    Screening breast examination - Primary     She had a bilateral screening mammogram on April 22, 2024 through Dr. Dhara Jalloh's office.  Unfortunately, the dictated report is all we have as the images were not available for review.  According to the dictated report of right breast asymmetry was noted but not clear with spot compression.  Ultrasound of the right breast performed by Dr. Jalloh showed no discrete mass, cystic lesion or solid mass.  This was read as a BI-RADS 2.  No evidence of disease on clinical exam of the right breast or contralateral left breast.  At this point, she will repeat her mammogram through Dr. Jalloh's office in April 2025.  Will plan to see her back in the office in 1 year as long as nothing is worrisome on her breast imaging and she has no breast complaints.         Relevant Orders    BI SCREENING MAMMOGRAM BILATERAL(TOMOSYNTHESIS)   We will plan to see Yuli back in the office in 1 year for repeat clinical exam.  In the interim, she was given a prescription for a follow-up mammogram that will be due in April 2025.  She will complete this through Dr. Jalloh's office.  She will also attempt to obtain the images from Dr. Jalloh's office as well as bring next years mammographic imaging with her to her annual appointment for full review and recommendations.     Return in about 1 year (around 10/21/2025).    JACQUELINE Collier

## 2024-10-21 NOTE — LETTER
October 21, 2024     Dhara Jalloh MD  [address]  [address]  [facility] [address]    Patient: Yuli Kuo Dr.  YOB: 1961  Date of Visit: 10/21/2024      Dear Dr. Jalloh:    Thank you for referring Yuli Kuo Dr. to me for evaluation. Below are my notes for this consultation.    If you have questions, please do not hesitate to call me. I look forward to following your patient along with you.         Sincerely,        JACQUELINE Collier        CC: No Recipients    Tali Stanley CRNP  10/21/2024  1:18 PM  Sign when Signing Visit  Yuli returns to the office today for follow-up given her dense breast tissue and anxiety regarding her overall breast health.  She offers no breast complaints and was happy to share that her house in [place] has been completed and she and her  spend 6 months out of the year in [place] with her 2 dogs and cat.  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  She had a bilateral mammogram done through Dr. Dhara Jalloh's office on April 22, 2024 but unfortunately there is no disc for review.  According to the dictated report, there are scattered calcifications in the left breast which remain unchanged.  A right breast asymmetry was noted and a definite mass was not identified with spot compression.  An ultrasound was performed by Dr. Jalloh but again no discrete mass, cystic lesion or solid mass was identified.  This was read as a BI-RADS 2 and she will be due for repeat breast imaging in April 2025.  She has scattered breast tissue density according to the dictated report.      There are no changes in her past medical history, past surgical history, medications or allergies.    There are no changes in her family history.    ROS:   Significant for no abnormalities in all systems reviewed.    Physical exam: Well-developed, well-nourished  female in no apparent distress.  She is alert and oriented ×3 and her mood and affect are appropriate.  Her HEENT has no cervical or supraclavicular adenopathy.  There are no thyroid masses.  Her breast examination is symmetric.  There are no dominant masses, skin changes, nipple discharge or axillary adenopathy.  Assessment/Plan:  Problem List Items Addressed This Visit          Other    Screening breast examination - Primary     She had a bilateral screening mammogram on April 22, 2024 through Dr. Dhara Jalloh's office.  Unfortunately, the dictated report is all we have as the images were not available for review.  According to the dictated report of right breast asymmetry was noted but not clear with spot compression.  Ultrasound of the right breast performed by Dr. Jalloh showed no discrete mass, cystic lesion or solid mass.  This was read as a BI-RADS 2.  No evidence of disease on clinical exam of the right breast or contralateral left breast.  At this point, she will repeat her mammogram through Dr. Jalloh's office in April 2025.  Will plan to see her back in the office in 1 year as long as nothing is worrisome on her breast imaging and she has no breast complaints.         Relevant Orders    BI SCREENING MAMMOGRAM BILATERAL(TOMOSYNTHESIS)   We will plan to see Yuli back in the office in 1 year for repeat clinical exam.  In the interim, she was given a prescription for a follow-up mammogram that will be due in April 2025.  She will complete this through Dr. Jalloh's office.  She will also attempt to obtain the images from Dr. Jalloh's office as well as bring next years mammographic imaging with her to her annual appointment for full review and recommendations.     Return in about 1 year (around 10/21/2025).    JACQUELINE Collier

## 2024-10-21 NOTE — ASSESSMENT & PLAN NOTE
She had a bilateral screening mammogram on April 22, 2024 through Dr. Dhara Jalloh's office.  Unfortunately, the dictated report is all we have as the images were not available for review.  According to the dictated report of right breast asymmetry was noted but not clear with spot compression.  Ultrasound of the right breast performed by Dr. Jalloh showed no discrete mass, cystic lesion or solid mass.  This was read as a BI-RADS 2.  No evidence of disease on clinical exam of the right breast or contralateral left breast.  At this point, she will repeat her mammogram through Dr. Jalloh's office in April 2025.  Will plan to see her back in the office in 1 year as long as nothing is worrisome on her breast imaging and she has no breast complaints.

## 2024-10-21 NOTE — PROGRESS NOTES
Yuli returns to the office today for follow-up given her dense breast tissue and anxiety regarding her overall breast health.  She offers no breast complaints and was happy to share that her house in [place] has been completed and she and her  spend 6 months out of the year in [place] with her 2 dogs and cat.  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  She had a bilateral mammogram done through Dr. Dhara Jalloh's office on April 22, 2024 but unfortunately there is no disc for review.  According to the dictated report, there are scattered calcifications in the left breast which remain unchanged.  A right breast asymmetry was noted and a definite mass was not identified with spot compression.  An ultrasound was performed by Dr. Jalloh but again no discrete mass, cystic lesion or solid mass was identified.  This was read as a BI-RADS 2 and she will be due for repeat breast imaging in April 2025.  She has scattered breast tissue density according to the dictated report.      There are no changes in her past medical history, past surgical history, medications or allergies.    There are no changes in her family history.    ROS:   Significant for no abnormalities in all systems reviewed.    Physical exam: Well-developed, well-nourished female in no apparent distress.  She is alert and oriented ×3 and her mood and affect are appropriate.  Her HEENT has no cervical or supraclavicular adenopathy.  There are no thyroid masses.  Her breast examination is symmetric.  There are no dominant masses, skin changes, nipple discharge or axillary adenopathy.  Assessment/Plan :  Problem List Items Addressed This Visit          Other    Screening breast examination - Primary     She had a bilateral screening mammogram on April 22, 2024 through Dr. Dhara Jalloh's office.  Unfortunately, the dictated report is all we have as the images  were not available for review.  According to the dictated report of right breast asymmetry was noted but not clear with spot compression.  Ultrasound of the right breast performed by Dr. Jalloh showed no discrete mass, cystic lesion or solid mass.  This was read as a BI-RADS 2.  No evidence of disease on clinical exam of the right breast or contralateral left breast.  At this point, she will repeat her mammogram through Dr. Jalloh's office in April 2025.  Will plan to see her back in the office in 1 year as long as nothing is worrisome on her breast imaging and she has no breast complaints.         Relevant Orders    BI SCREENING MAMMOGRAM BILATERAL(TOMOSYNTHESIS)   We will plan to see Yuli back in the office in 1 year for repeat clinical exam.  In the interim, she was given a prescription for a follow-up mammogram that will be due in April 2025.  She will complete this through Dr. Jalloh's office.  She will also attempt to obtain the images from Dr. Jalloh's office as well as bring next years mammographic imaging with her to her annual appointment for full review and recommendations.     Return in about 1 year (around 10/21/2025).    JACQUELINE Collier

## 2024-10-21 NOTE — PROGRESS NOTES
"SUBJECTIVE:   62 y.o. female for a same day visit    Abdominal pain: over the last year or so she has had 4 bouts of pain in the epigastrium always after eating, it is described as sharp and 8/10 in intensity, it is better with laying down, she has taken pepcid and omeprazole which seems to relieve it within about 20 minutes, she otherwise has no ongoing epigastric pain otherwise outside of these events, she does have known gallstones and is concerned about biliary colic but is not interested in cholecystectomy, fortunately she just had an EGD with Dr. Cain and her esophagus and stomach appeared normal, the pathology is pending but likelihood of h pylori is low.    Thyroid nodule: She is wondering about when to follow up her thyroid nodule, this was noted last year, it was TR4 but under 1cm which technically doesn't meet criteria to follow, she has no sx at this time    Past Medical History:   Diagnosis Date    Avulsion fracture of distal fibula 10/2013    Basal cell carcinoma     Disease of thyroid gland 2010    low    Eosinophilic esophagitis     Fibroids     Gilbert's syndrome     Hemorrhoids     rectal    Multinodular goiter 2013     Patient Active Problem List   Diagnosis    History of basal cell carcinoma    Family history of early CAD    [place] disease    Hemorrhoids    Uterine fibroid    Screening breast examination    Dense breast tissue on mammogram     Social History     Tobacco Use    Smoking status: Never    Smokeless tobacco: Never   Substance Use Topics    Alcohol use: No    Drug use: No     No current outpatient medications on file.   Allergies   Allergen Reactions    Marijuana/Cannabinoid Anaphylaxis    Influenza Virus Vaccine Tv 13-14 (18+) Cell Deriv.      Other reaction(s): vertigo    Penicillins Hives       ROS negative unless otherwise specified    OBJECTIVE:  Visit Vitals  /88 (BP Location: Left upper arm)   Pulse 73   Resp 18   Ht 1.702 m (5' 7\")   Wt 71 kg (156 lb 9.6 oz)   SpO2 " 98%   BMI 24.53 kg/m²      Gait:  Normal  Alert and well appearing   Awake and oriented with normal affect and appropriate behavior   Abd: Soft, non-tender, normal bowel sounds; no bruits, organomegaly or masses.  Skin:  No lesions on exposed skin    Assessment/Plan     1. Epigastric pain (Primary)  EGD reassuring, less likely GERD/gastritis based on pattern of sx however she can try acutely neutralizing acid with tums, maalox or alkaseltzer next time she has an attack to confirm or rule out excess acid, unlikely h pylori with no gastritis on EGD but bx is pending, the pain is infrequent and currently doesn't warrant any daily medication  - CBC and Differential; Future    2. Gallstones  After discussion today it is possible her pain is related to biliary colic but she prefers not to have elective cholecystectomy which is reasonable as her sx are very infrequent, she will continue a low fat diet but understands alarm signs that may indicate cholecystitis which would warrant more urgent surgical intervention  - Comprehensive metabolic panel; Future  - Bilirubin, total and direct; Future    3. Family history of early CAD  - Lipid panel; Future  - Hemoglobin A1c; Future    4. Thyroid nodule  After discussion today we agree that her nodule does not need to be followed up currently but perhaps in 2-3 years to ensure stability     F/u 1 year for annual physical

## 2024-10-29 ENCOUNTER — APPOINTMENT (OUTPATIENT)
Dept: LAB | Facility: HOSPITAL | Age: 64
End: 2024-10-29
Attending: INTERNAL MEDICINE
Payer: MEDICARE

## 2024-10-29 ENCOUNTER — TRANSCRIBE ORDERS (OUTPATIENT)
Dept: LAB | Facility: HOSPITAL | Age: 64
End: 2024-10-29

## 2024-10-29 DIAGNOSIS — R10.13 EPIGASTRIC PAIN: ICD-10-CM

## 2024-10-29 DIAGNOSIS — K80.20 CALCULUS OF GALLBLADDER WITHOUT CHOLECYSTITIS WITHOUT OBSTRUCTION: ICD-10-CM

## 2024-10-29 DIAGNOSIS — Z82.49 FAMILY HISTORY OF ISCHEMIC HEART DISEASE AND OTHER DISEASES OF THE CIRCULATORY SYSTEM: ICD-10-CM

## 2024-10-29 DIAGNOSIS — R10.13 EPIGASTRIC PAIN: Primary | ICD-10-CM

## 2024-10-29 PROBLEM — E78.00 PURE HYPERCHOLESTEROLEMIA: Status: ACTIVE | Noted: 2024-10-29

## 2024-10-29 LAB
ALBUMIN SERPL-MCNC: 4.6 G/DL (ref 3.5–5.7)
ALP SERPL-CCNC: 84 IU/L (ref 34–125)
ALT SERPL-CCNC: 19 IU/L (ref 7–52)
ANION GAP SERPL CALC-SCNC: 5 MEQ/L (ref 3–15)
AST SERPL-CCNC: 20 IU/L (ref 13–39)
BASOPHILS # BLD: 0.04 K/UL (ref 0.01–0.1)
BASOPHILS NFR BLD: 0.7 %
BILIRUB DIRECT SERPL-MCNC: 0.3 MG/DL
BILIRUB SERPL-MCNC: 2 MG/DL (ref 0.3–1.2)
BILIRUB SERPL-MCNC: 2 MG/DL (ref 0.3–1.2)
BUN SERPL-MCNC: 18 MG/DL (ref 7–25)
CALCIUM SERPL-MCNC: 9.5 MG/DL (ref 8.6–10.3)
CHLORIDE SERPL-SCNC: 106 MEQ/L (ref 98–107)
CHOLEST SERPL-MCNC: 206 MG/DL
CO2 SERPL-SCNC: 29 MEQ/L (ref 21–31)
CREAT SERPL-MCNC: 0.8 MG/DL (ref 0.6–1.2)
DIFFERENTIAL METHOD BLD: NORMAL
EGFRCR SERPLBLD CKD-EPI 2021: >60 ML/MIN/1.73M*2
EOSINOPHIL # BLD: 0.35 K/UL (ref 0.04–0.36)
EOSINOPHIL NFR BLD: 5.8 %
ERYTHROCYTE [DISTWIDTH] IN BLOOD BY AUTOMATED COUNT: 12.4 % (ref 11.7–14.4)
EST. AVERAGE GLUCOSE BLD GHB EST-MCNC: 105 MG/DL
GLUCOSE SERPL-MCNC: 86 MG/DL (ref 70–99)
HBA1C MFR BLD: 5.3 %
HCT VFR BLD AUTO: 43.1 % (ref 35–45)
HDLC SERPL-MCNC: 72 MG/DL
HDLC SERPL: 2.9 {RATIO}
HGB BLD-MCNC: 14.6 G/DL (ref 11.8–15.7)
IMM GRANULOCYTES # BLD AUTO: 0.02 K/UL (ref 0–0.08)
IMM GRANULOCYTES NFR BLD AUTO: 0.3 %
LDLC SERPL CALC-MCNC: 116 MG/DL
LYMPHOCYTES # BLD: 1.31 K/UL (ref 1.2–3.5)
LYMPHOCYTES NFR BLD: 21.7 %
MCH RBC QN AUTO: 30.7 PG (ref 28–33.2)
MCHC RBC AUTO-ENTMCNC: 33.9 G/DL (ref 32.2–35.5)
MCV RBC AUTO: 90.5 FL (ref 83–98)
MONOCYTES # BLD: 0.5 K/UL (ref 0.28–0.8)
MONOCYTES NFR BLD: 8.3 %
NEUTROPHILS # BLD: 3.82 K/UL (ref 1.7–7)
NEUTS SEG NFR BLD: 63.2 %
NONHDLC SERPL-MCNC: 134 MG/DL
NRBC BLD-RTO: 0 %
PLATELET # BLD AUTO: 233 K/UL (ref 150–369)
PMV BLD AUTO: 10.2 FL (ref 9.4–12.3)
POTASSIUM SERPL-SCNC: 4.7 MEQ/L (ref 3.5–5.1)
PROT SERPL-MCNC: 6.6 G/DL (ref 6–8.2)
RBC # BLD AUTO: 4.76 M/UL (ref 3.93–5.22)
SODIUM SERPL-SCNC: 140 MEQ/L (ref 136–145)
TRIGL SERPL-MCNC: 90 MG/DL
WBC # BLD AUTO: 6.04 K/UL (ref 3.8–10.5)

## 2024-10-29 PROCEDURE — 80061 LIPID PANEL: CPT

## 2024-10-29 PROCEDURE — 83036 HEMOGLOBIN GLYCOSYLATED A1C: CPT

## 2024-10-29 PROCEDURE — 85025 COMPLETE CBC W/AUTO DIFF WBC: CPT

## 2024-10-29 PROCEDURE — 36415 COLL VENOUS BLD VENIPUNCTURE: CPT

## 2024-10-29 PROCEDURE — 80053 COMPREHEN METABOLIC PANEL: CPT

## 2024-10-29 PROCEDURE — 82248 BILIRUBIN DIRECT: CPT

## 2024-10-29 NOTE — TELEPHONE ENCOUNTER
Spoke with patient, labs largely normal, lipids slightly elevated, will get CAC to further risk stratify

## 2025-05-02 DIAGNOSIS — E78.00 PURE HYPERCHOLESTEROLEMIA: Primary | ICD-10-CM
